# Patient Record
Sex: FEMALE | Race: WHITE | NOT HISPANIC OR LATINO | Employment: FULL TIME | URBAN - METROPOLITAN AREA
[De-identification: names, ages, dates, MRNs, and addresses within clinical notes are randomized per-mention and may not be internally consistent; named-entity substitution may affect disease eponyms.]

---

## 2024-02-26 ENCOUNTER — HOSPITAL ENCOUNTER (EMERGENCY)
Facility: HOSPITAL | Age: 19
Discharge: HOME | End: 2024-02-26
Attending: EMERGENCY MEDICINE | Admitting: EMERGENCY MEDICINE
Payer: COMMERCIAL

## 2024-02-26 VITALS
HEART RATE: 88 BPM | TEMPERATURE: 98.1 F | DIASTOLIC BLOOD PRESSURE: 73 MMHG | BODY MASS INDEX: 24.84 KG/M2 | RESPIRATION RATE: 18 BRPM | OXYGEN SATURATION: 98 % | SYSTOLIC BLOOD PRESSURE: 120 MMHG | WEIGHT: 135 LBS | HEIGHT: 62 IN

## 2024-02-26 DIAGNOSIS — N39.0 UTI (URINARY TRACT INFECTION): Primary | ICD-10-CM

## 2024-02-26 LAB
BACTERIA UR QL AUTO: ABNORMAL /HPF
BILIRUB UR QL STRIP: NEGATIVE
CLARITY UR: ABNORMAL
COLOR UR: YELLOW
EXT PREGNANCY TEST URINE: NEGATIVE
EXT. CONTROL: NORMAL
GLUCOSE UR STRIP-MCNC: NEGATIVE MG/DL
HGB UR QL STRIP.AUTO: ABNORMAL
KETONES UR STRIP-MCNC: ABNORMAL MG/DL
LEUKOCYTE ESTERASE UR QL STRIP: ABNORMAL
MUCOUS THREADS UR QL AUTO: ABNORMAL
NITRITE UR QL STRIP: NEGATIVE
NON-SQ EPI CELLS URNS QL MICRO: ABNORMAL /HPF
PH UR STRIP.AUTO: 6 [PH]
PROT UR STRIP-MCNC: ABNORMAL MG/DL
RBC #/AREA URNS AUTO: ABNORMAL /HPF
SP GR UR STRIP.AUTO: >=1.03 (ref 1–1.03)
UROBILINOGEN UR STRIP-ACNC: 2 MG/DL
WBC #/AREA URNS AUTO: ABNORMAL /HPF

## 2024-02-26 PROCEDURE — 81025 URINE PREGNANCY TEST: CPT | Performed by: EMERGENCY MEDICINE

## 2024-02-26 PROCEDURE — 87077 CULTURE AEROBIC IDENTIFY: CPT | Performed by: EMERGENCY MEDICINE

## 2024-02-26 PROCEDURE — 81001 URINALYSIS AUTO W/SCOPE: CPT | Performed by: EMERGENCY MEDICINE

## 2024-02-26 PROCEDURE — 87086 URINE CULTURE/COLONY COUNT: CPT | Performed by: EMERGENCY MEDICINE

## 2024-02-26 PROCEDURE — 99283 EMERGENCY DEPT VISIT LOW MDM: CPT

## 2024-02-26 PROCEDURE — 99284 EMERGENCY DEPT VISIT MOD MDM: CPT | Performed by: EMERGENCY MEDICINE

## 2024-02-26 RX ORDER — SULFAMETHOXAZOLE AND TRIMETHOPRIM 800; 160 MG/1; MG/1
1 TABLET ORAL ONCE
Status: COMPLETED | OUTPATIENT
Start: 2024-02-26 | End: 2024-02-26

## 2024-02-26 RX ORDER — SULFAMETHOXAZOLE AND TRIMETHOPRIM 800; 160 MG/1; MG/1
1 TABLET ORAL 2 TIMES DAILY
Qty: 14 TABLET | Refills: 0 | Status: SHIPPED | OUTPATIENT
Start: 2024-02-26 | End: 2024-03-04

## 2024-02-26 RX ADMIN — SULFAMETHOXAZOLE AND TRIMETHOPRIM 1 TABLET: 800; 160 TABLET ORAL at 21:03

## 2024-02-27 NOTE — ED PROVIDER NOTES
History  Chief Complaint   Patient presents with    Possible UTI     Pt c/o urinary frequency and burning for several days and blood in urine that started today. Pt taking somebody else keflex for several days.       Hx from patient few weeks of urinary frequency and burning -was taking keflex from a friend but symptoms got worse over last few days.        None       History reviewed. No pertinent past medical history.    History reviewed. No pertinent surgical history.    History reviewed. No pertinent family history.  I have reviewed and agree with the history as documented.    E-Cigarette/Vaping     E-Cigarette/Vaping Substances     Social History     Tobacco Use    Smoking status: Never    Smokeless tobacco: Never   Substance Use Topics    Alcohol use: Not Currently    Drug use: Not Currently       Review of Systems   Constitutional:  Negative for chills and fever.   HENT:  Negative for ear pain and sore throat.    Eyes:  Negative for pain and visual disturbance.   Respiratory:  Negative for cough and shortness of breath.    Cardiovascular:  Negative for chest pain and palpitations.   Gastrointestinal:  Negative for abdominal pain and vomiting.   Genitourinary:  Positive for dysuria and frequency. Negative for hematuria.   Musculoskeletal:  Negative for arthralgias and back pain.   Skin:  Negative for color change and rash.   Neurological:  Negative for seizures and syncope.   All other systems reviewed and are negative.      Physical Exam  Physical Exam  Vitals and nursing note reviewed.   Constitutional:       General: She is not in acute distress.     Appearance: She is well-developed.   HENT:      Head: Normocephalic and atraumatic.   Eyes:      Conjunctiva/sclera: Conjunctivae normal.   Cardiovascular:      Rate and Rhythm: Normal rate and regular rhythm.      Heart sounds: No murmur heard.  Pulmonary:      Effort: Pulmonary effort is normal. No respiratory distress.      Breath sounds: Normal breath  sounds.   Abdominal:      Palpations: Abdomen is soft.      Tenderness: There is no abdominal tenderness.   Musculoskeletal:         General: No swelling.      Cervical back: Neck supple.   Skin:     General: Skin is warm and dry.      Capillary Refill: Capillary refill takes less than 2 seconds.   Neurological:      Mental Status: She is alert.   Psychiatric:         Mood and Affect: Mood normal.         Vital Signs  ED Triage Vitals   Temperature Pulse Respirations Blood Pressure SpO2   02/26/24 2004 02/26/24 1959 02/26/24 1959 02/26/24 2001 02/26/24 1959   98.1 °F (36.7 °C) 88 18 120/73 98 %      Temp src Heart Rate Source Patient Position - Orthostatic VS BP Location FiO2 (%)   -- -- -- -- --             Pain Score       --                  Vitals:    02/26/24 1959 02/26/24 2001   BP:  120/73   Pulse: 88          Visual Acuity      ED Medications  Medications   sulfamethoxazole-trimethoprim (BACTRIM DS) 800-160 mg per tablet 1 tablet (1 tablet Oral Given 2/26/24 2103)       Diagnostic Studies  Results Reviewed       Procedure Component Value Units Date/Time    Urine Microscopic [[id number]]  (Abnormal) Collected: 02/26/24 2020    Lab Status: Final result Specimen: Urine, Clean Catch Updated: 02/26/24 2136     RBC, UA Innumerable /hpf      WBC, UA 30-50 /hpf      Epithelial Cells Occasional /hpf      Bacteria, UA Occasional /hpf      MUCUS THREADS Occasional    Urine culture [[id number]] Collected: 02/26/24 2020    Lab Status: In process Specimen: Urine, Clean Catch Updated: 02/26/24 2136    UA w Reflex to Microscopic w Reflex to Culture [[id number]]  (Abnormal) Collected: 02/26/24 2020    Lab Status: Final result Specimen: Urine, Clean Catch Updated: 02/26/24 2038     Color, UA Yellow     Clarity, UA Slightly Cloudy     Specific Gravity, UA >=1.030     pH, UA 6.0     Leukocytes, UA Large     Nitrite, UA Negative     Protein,  (2+) mg/dl      Glucose, UA Negative mg/dl      Ketones, UA 40 (2+) mg/dl       "Urobilinogen, UA 2.0 mg/dl      Bilirubin, UA Negative     Occult Blood, UA Large    POCT pregnancy, urine [[id number]]  (Normal) Resulted: 02/26/24 2023    Lab Status: Final result Updated: 02/26/24 2023     EXT Preg Test, Ur Negative     Control Valid                   No orders to display              Procedures  Procedures         ED Course         CRAFFT      Flowsheet Row Most Recent Value   CRAFFT Initial Screen: During the past 12 months, did you:    1. Drink any alcohol (more than a few sips)?  No Filed at: 02/26/2024 2005   2. Smoke any marijuana or hashish No Filed at: 02/26/2024 2005   3. Use anything else to get high? (\"anything else\" includes illegal drugs, over the counter and prescription drugs, and things that you sniff or 'alcantara')? No Filed at: 02/26/2024 2005                                            Medical Decision Making  Diff is UTI, less likely sepsis or pyelo    Amount and/or Complexity of Data Reviewed  Labs: ordered.    Risk  Prescription drug management.             Disposition  Final diagnoses:   UTI (urinary tract infection)     Time reflects when diagnosis was documented in both MDM as applicable and the Disposition within this note       Time User Action Codes Description Comment    2/26/2024  8:57 PM Regis Mcpherson Add [N39.0] UTI (urinary tract infection)           ED Disposition       ED Disposition   Discharge    Condition   Stable    Date/Time   Mon Feb 26, 2024 2057    Comment   Yanira Scott discharge to home/self care.                   Follow-up Information    None         Discharge Medication List as of 2/26/2024  8:58 PM        START taking these medications    Details   sulfamethoxazole-trimethoprim (BACTRIM DS) 800-160 mg per tablet Take 1 tablet by mouth 2 (two) times a day for 7 days smx-tmp DS (BACTRIM) 800-160 mg tabs (1tab q12 D10), Starting Mon 2/26/2024, Until Mon 3/4/2024, Normal             No discharge procedures on file.    PDMP Review       None            ED " Provider  Electronically Signed by             Regis Mcpherson,   02/27/24 0380

## 2024-02-28 LAB — BACTERIA UR CULT: ABNORMAL

## 2024-04-13 ENCOUNTER — APPOINTMENT (EMERGENCY)
Dept: RADIOLOGY | Facility: HOSPITAL | Age: 19
End: 2024-04-13
Payer: COMMERCIAL

## 2024-04-13 ENCOUNTER — HOSPITAL ENCOUNTER (EMERGENCY)
Facility: HOSPITAL | Age: 19
Discharge: HOME | End: 2024-04-13
Attending: EMERGENCY MEDICINE
Payer: COMMERCIAL

## 2024-04-13 VITALS
HEART RATE: 74 BPM | BODY MASS INDEX: 23.78 KG/M2 | WEIGHT: 130 LBS | DIASTOLIC BLOOD PRESSURE: 66 MMHG | RESPIRATION RATE: 18 BRPM | SYSTOLIC BLOOD PRESSURE: 100 MMHG | OXYGEN SATURATION: 99 % | TEMPERATURE: 98.6 F

## 2024-04-13 DIAGNOSIS — S62.316A CLOSED DISPLACED FRACTURE OF BASE OF FIFTH METACARPAL BONE OF RIGHT HAND, INITIAL ENCOUNTER: Primary | ICD-10-CM

## 2024-04-13 PROCEDURE — 99283 EMERGENCY DEPT VISIT LOW MDM: CPT

## 2024-04-13 PROCEDURE — 99284 EMERGENCY DEPT VISIT MOD MDM: CPT

## 2024-04-13 PROCEDURE — 29125 APPL SHORT ARM SPLINT STATIC: CPT

## 2024-04-13 PROCEDURE — 73130 X-RAY EXAM OF HAND: CPT

## 2024-04-13 RX ORDER — ACETAMINOPHEN 325 MG/1
975 TABLET ORAL ONCE
Status: COMPLETED | OUTPATIENT
Start: 2024-04-13 | End: 2024-04-13

## 2024-04-13 RX ORDER — IBUPROFEN 600 MG/1
600 TABLET ORAL ONCE
Status: COMPLETED | OUTPATIENT
Start: 2024-04-13 | End: 2024-04-13

## 2024-04-13 RX ADMIN — IBUPROFEN 600 MG: 600 TABLET, FILM COATED ORAL at 19:22

## 2024-04-13 RX ADMIN — ACETAMINOPHEN 975 MG: 325 TABLET, FILM COATED ORAL at 19:22

## 2024-04-13 NOTE — Clinical Note
Yanira Scott was seen and treated in our emergency department on 4/13/2024.        Other - See Comments    No heavy lifting with right hand    Diagnosis: Right hand fracture    Yanira  may return to work on return date.    She may return on this date: 04/17/2024    To be on light duty given right hand fracture until cleared by orthopedics.     If you have any questions or concerns, please don't hesitate to call.      REGINE Zhao    ______________________________           _______________          _______________  Hospital Representative                              Date                                Time

## 2024-04-13 NOTE — ED PROVIDER NOTES
History  Chief Complaint   Patient presents with    Hand Pain     Pt to ED for R hand pain. States she punched her bed frame last night. Able to feel and move fingers, + radial pulse.     The patient is a 19-year-old female presenting for evaluation of right hand pain.  Patient got upset last night and punched her bed frame.  She notes feeling instant pain to the right fifth knuckle.  She notes over the last day it has become progressively more swollen and bruised at the base of the right finger where the hand meets the wrist.  She expresses concern for fracture.  She is right-hand dominant.  She denies numbness/tingling to the right fifth finger.  She notes she is less able to make a fist due to pain.  She denies wrist pain, proximal right elbow, or proximal right shoulder pain.  She denies prior injury to this hand.      History provided by:  Patient   used: No        None       History reviewed. No pertinent past medical history.    History reviewed. No pertinent surgical history.    History reviewed. No pertinent family history.  I have reviewed and agree with the history as documented.    E-Cigarette/Vaping    E-Cigarette Use Current Every Day User      E-Cigarette/Vaping Substances     Social History     Tobacco Use    Smoking status: Never    Smokeless tobacco: Never   Vaping Use    Vaping status: Every Day   Substance Use Topics    Alcohol use: Not Currently    Drug use: Not Currently       Review of Systems   Musculoskeletal:  Positive for arthralgias (Right hand).   Skin:  Positive for color change (Bruising of right hand).   All other systems reviewed and are negative.      Physical Exam  Physical Exam  Vitals and nursing note reviewed.   Constitutional:       General: She is not in acute distress.     Appearance: Normal appearance. She is normal weight. She is not ill-appearing, toxic-appearing or diaphoretic.   HENT:      Head: Normocephalic and atraumatic.      Nose: Nose normal.       Mouth/Throat:      Mouth: Mucous membranes are moist.      Pharynx: Oropharynx is clear.   Eyes:      Extraocular Movements: Extraocular movements intact.      Conjunctiva/sclera: Conjunctivae normal.   Cardiovascular:      Rate and Rhythm: Normal rate.   Pulmonary:      Effort: Pulmonary effort is normal. No respiratory distress.   Musculoskeletal:      Right wrist: No swelling, deformity or bony tenderness. Normal range of motion.      Right hand: Swelling and bony tenderness (Proximal fifth metacarpal) present. Decreased range of motion. Decreased strength of thumb/finger opposition and wrist extension. Normal strength of finger abduction. Normal sensation. Normal capillary refill. Normal pulse.      Left hand: Normal.        Hands:       Cervical back: Normal range of motion and neck supple.      Comments: No rotational deformity of the fifth finger when closing fist.  Slight decreased strength and wrist extension and T/F opposition   Skin:     General: Skin is warm and dry.      Capillary Refill: Capillary refill takes less than 2 seconds.   Neurological:      General: No focal deficit present.      Mental Status: She is alert and oriented to person, place, and time. Mental status is at baseline.         Vital Signs  ED Triage Vitals [04/13/24 1809]   Temperature Pulse Respirations Blood Pressure SpO2   98.6 °F (37 °C) 74 18 100/66 99 %      Temp Source Heart Rate Source Patient Position - Orthostatic VS BP Location FiO2 (%)   Temporal Monitor Sitting Left arm --      Pain Score       4           Vitals:    04/13/24 1809   BP: 100/66   Pulse: 74   Patient Position - Orthostatic VS: Sitting         Visual Acuity      ED Medications  Medications   acetaminophen (TYLENOL) tablet 975 mg (975 mg Oral Given 4/13/24 1922)   ibuprofen (MOTRIN) tablet 600 mg (600 mg Oral Given 4/13/24 1922)       Diagnostic Studies  Results Reviewed       None                   XR hand 3+ views RIGHT    (Results Pending)         "      Procedures  Splint application    Date/Time: 4/13/2024 7:15 PM    Performed by: REGINE Zhao  Authorized by: REGINE Zhao  Universal Protocol:  Consent: Verbal consent obtained.  Risks and benefits: risks, benefits and alternatives were discussed  Consent given by: patient  Time out: Immediately prior to procedure a \"time out\" was called to verify the correct patient, procedure, equipment, support staff and site/side marked as required.  Timeout called at: 4/13/2024 7:10 PM.  Patient understanding: patient states understanding of the procedure being performed  Radiology Images displayed and confirmed. If images not available, report reviewed: imaging studies available  Patient identity confirmed: verbally with patient    Pre-procedure details:     Sensation:  Normal    Skin color:  Pink  Procedure details:     Laterality:  Right    Location:  Hand    Hand:  R hand    Splint type:  Ulnar gutter    Supplies:  2 layer wrap, 3 layer wrap and Ortho-Glass  Post-procedure details:     Pain:  Improved    Sensation:  Normal    Skin color:  Pink    Patient tolerance of procedure:  Tolerated well, no immediate complications           ED Course                               SBIRT 20yo+      Flowsheet Row Most Recent Value   Initial Alcohol Screen: US AUDIT-C     1. How often do you have a drink containing alcohol? 0 Filed at: 04/13/2024 1810   2. How many drinks containing alcohol do you have on a typical day you are drinking?  0 Filed at: 04/13/2024 1810   3a. Male UNDER 65: How often do you have five or more drinks on one occasion? 0 Filed at: 04/13/2024 1810   3b. FEMALE Any Age, or MALE 65+: How often do you have 4 or more drinks on one occassion? 0 Filed at: 04/13/2024 1810   Audit-C Score 0 Filed at: 04/13/2024 1810   CIRILO: How many times in the past year have you...    Used an illegal drug or used a prescription medication for non-medical reasons? Never Filed at: 04/13/2024 1810      "                 Medical Decision Making  DDx including but not limited to: contusion, fracture, sprain    Acute fracture of base of fifth metacarpal of right hand.  Patient splinted.  Medicated with Tylenol and Motrin.  Plan for orthopedic surgery referral, ongoing NSAIDs and Tylenol for comfort.  Work note provided.  Strict return precautions discussed and she demonstrates understanding by teach back method.  Patient with improved appearance and pain control at time of discharge.    Problems Addressed:  Closed displaced fracture of base of fifth metacarpal bone of right hand, initial encounter: acute illness or injury    Amount and/or Complexity of Data Reviewed  Radiology: ordered.    Risk  OTC drugs.  Prescription drug management.             Disposition  Final diagnoses:   Closed displaced fracture of base of fifth metacarpal bone of right hand, initial encounter     Time reflects when diagnosis was documented in both MDM as applicable and the Disposition within this note       Time User Action Codes Description Comment    4/13/2024  7:06 PM Alanna Edwards Add [S62.316A] Closed displaced fracture of base of fifth metacarpal bone of right hand, initial encounter           ED Disposition       ED Disposition   Discharge    Condition   Stable    Date/Time   Sat Apr 13, 2024 1905    Comment   Yanira Scott discharge to home/self care.                   Follow-up Information       Follow up With Specialties Details Why Contact Info Additional Information    [facility] [place] [facility] Call on 4/15/2024 to schedule an appointment [address]  [address] [address] [phone] [facility] [place], [address], [facility] [address] [place], [place], [address] [phone]            There are no discharge medications for this patient.          PDMP Review       None            ED Provider  Electronically Signed by             Alanna  REGINE Edwards  04/13/24 2221

## 2024-04-15 ENCOUNTER — OFFICE VISIT (OUTPATIENT)
Dept: OBGYN CLINIC | Facility: CLINIC | Age: 19
End: 2024-04-15
Payer: COMMERCIAL

## 2024-04-15 VITALS
DIASTOLIC BLOOD PRESSURE: 68 MMHG | WEIGHT: 135 LBS | BODY MASS INDEX: 24.84 KG/M2 | SYSTOLIC BLOOD PRESSURE: 108 MMHG | HEIGHT: 62 IN

## 2024-04-15 DIAGNOSIS — S62.316A CLOSED DISPLACED FRACTURE OF BASE OF FIFTH METACARPAL BONE OF RIGHT HAND, INITIAL ENCOUNTER: ICD-10-CM

## 2024-04-15 PROCEDURE — 99203 OFFICE O/P NEW LOW 30 MIN: CPT | Performed by: ORTHOPAEDIC SURGERY

## 2024-04-15 RX ORDER — IBUPROFEN 200 MG
TABLET ORAL EVERY 6 HOURS PRN
COMMUNITY

## 2024-04-15 RX ORDER — ACETAMINOPHEN 500 MG
500 TABLET ORAL EVERY 6 HOURS PRN
COMMUNITY

## 2024-04-15 NOTE — PROGRESS NOTES
Assessment/Plan:  1. Closed displaced fracture of base of fifth metacarpal bone of right hand, initial encounter  Ambulatory Referral to Orthopedic Surgery          Subjective history, physical examination performed, diagnostic imaging reviewed at today's visit  Diagnosis was discussed right fifth metacarpal fracture, DOI 4/13/2024.  Treatment options were discussed which included nonoperative treatment in the form of cast or splint which can be removed for hygiene and for HEP for finger ROM.    Risks, benefits, and realistic expectations for treatment options were discussed.    The patient was given an opportunity to ask questions.  Questions were answered to the patient's satisfaction.    Through shared decision making, the patient decided to move forward with TKO splint of right hand at all times. May remove splint for hygeien use only.  Discussed with patient that it takes 6-8 weeks for fracture to properly heal.  Work note was given to patient stating she must wear splint at all times and no lifting pushing pulling objects greater than 5 pounds.  Patient will follow-up in 4 weeks for reevaluation and repeat x-rays of right hand.        cc: Right hand pain    Subjective:   Yanira Scott is a right hand dominant 19 y.o. female who presents for initial evaluation of right hand pain.  Patient states 2 days ago she punched her bed frame and had significant pain within her right hand.  Patient states she went to the emergency department that day where she obtained x-rays and was put in a splint for her right hand due to hand fracture.  Patient states she has been taking Tylenol and ibuprofen for pain control.  Patient states she continues to have swelling throughout her right hand that can go down to her fingers causing stiffness within her hand.  Patient has not no previous injuries to right hand.      Review of Systems   Constitutional:  Positive for activity change. Negative for chills, fever and unexpected weight  change.   HENT:  Negative for hearing loss, nosebleeds and sore throat.    Eyes:  Negative for pain, redness and visual disturbance.   Respiratory:  Negative for cough, shortness of breath and wheezing.    Cardiovascular:  Negative for chest pain, palpitations and leg swelling.   Gastrointestinal:  Negative for abdominal pain, nausea and vomiting.   Endocrine: Negative for polydipsia and polyuria.   Genitourinary:  Negative for dysuria and hematuria.   Musculoskeletal:  Positive for arthralgias.        See HPI   Skin:  Negative for rash and wound.   Neurological:  Negative for dizziness, numbness and headaches.   Psychiatric/Behavioral:  Negative for decreased concentration and suicidal ideas. The patient is not nervous/anxious.          History reviewed. No pertinent past medical history.    History reviewed. No pertinent surgical history.    History reviewed. No pertinent family history.    Social History     Occupational History    Not on file   Tobacco Use    Smoking status: Never    Smokeless tobacco: Never   Vaping Use    Vaping status: Every Day   Substance and Sexual Activity    Alcohol use: Not Currently    Drug use: Not Currently    Sexual activity: Not on file         Current Outpatient Medications:     acetaminophen (TYLENOL) 500 mg tablet, Take 500 mg by mouth every 6 (six) hours as needed for mild pain, Disp: , Rfl:     ibuprofen (MOTRIN) 200 mg tablet, Take by mouth every 6 (six) hours as needed for mild pain, Disp: , Rfl:     No Known Allergies    Objective:  Vitals:    04/15/24 0837   BP: 108/68       The patient was awake, alert, and oriented to person, place, and time.  No acute distress.  Normocephalic.  EOMI.  Mucous membranes moist.  Normal respiratory effort.    Examination of the affected extremity was compared to the unaffected extremity.  Skin was intact.  +swelling +ecchymosis.  No deformity.  Hand and fingers were warm and well-perfused.  Capillary refill was brisk. Active range of motion  of wrists and fingers limited.    No rotational deformity   Limited ROM due to swelling.  +TTP base of 5th MC    Imaging/Diagnostic Studies:    I reviewed imaging studies dated 4/15/24 which included right hand XR .  These images studies demonstrated base of fifth metacarpal fracture.           Scribe Attestation      I,:  Jose Mercedes am acting as a scribe while in the presence of the attending physician.:       I,:  Genesis Barrett MD personally performed the services described in this documentation    as scribed in my presence.:               This document was created using speech voice recognition software.   Grammatical errors, random word insertions, pronoun errors, and incomplete sentences are an occasional consequence of this system due to software limitations, ambient noise, and hardware issues.   Any formal questions or concerns about content, text, or information contained within the body of this dictation should be directly addressed to the provider for clarification.

## 2024-04-15 NOTE — LETTER
April 15, 2024     Patient: Yanira Scott  YOB: 2005  Date of Visit: 4/15/2024      To Whom it May Concern:    Yanira Scott is under my professional care. aYnira was seen in my office on 4/15/2024. Yanira should use splint at all times while at work.  Patient may not lift, push, or pull not greater then 5 lbs. please allow accommodations    If you have any questions or concerns, please don't hesitate to call.         Sincerely,          Genesis Barrett MD        CC: No Recipients

## 2024-05-13 ENCOUNTER — APPOINTMENT (OUTPATIENT)
Dept: RADIOLOGY | Facility: CLINIC | Age: 19
End: 2024-05-13
Payer: COMMERCIAL

## 2024-05-13 ENCOUNTER — OFFICE VISIT (OUTPATIENT)
Dept: OBGYN CLINIC | Facility: CLINIC | Age: 19
End: 2024-05-13
Payer: COMMERCIAL

## 2024-05-13 VITALS
WEIGHT: 142 LBS | SYSTOLIC BLOOD PRESSURE: 120 MMHG | HEIGHT: 62 IN | BODY MASS INDEX: 26.13 KG/M2 | DIASTOLIC BLOOD PRESSURE: 78 MMHG

## 2024-05-13 DIAGNOSIS — S62.316D CLOSED DISPLACED FRACTURE OF BASE OF FIFTH METACARPAL BONE OF RIGHT HAND WITH ROUTINE HEALING, SUBSEQUENT ENCOUNTER: Primary | ICD-10-CM

## 2024-05-13 DIAGNOSIS — S62.316D CLOSED DISPLACED FRACTURE OF BASE OF FIFTH METACARPAL BONE OF RIGHT HAND WITH ROUTINE HEALING, SUBSEQUENT ENCOUNTER: ICD-10-CM

## 2024-05-13 PROCEDURE — 99213 OFFICE O/P EST LOW 20 MIN: CPT | Performed by: ORTHOPAEDIC SURGERY

## 2024-05-13 PROCEDURE — 73130 X-RAY EXAM OF HAND: CPT

## 2024-05-13 NOTE — PROGRESS NOTES
Assessment/Plan:  1. Closed displaced fracture of base of fifth metacarpal bone of right hand with routine healing, subsequent encounter  XR hand 3+ vw right    Ambulatory Referral to PT/OT Hand Therapy          Subjective history, physical examination performed, diagnostic imaging reviewed at today's visit  Diagnosis was discussed right fifth metacarpal fracture, DOI 4/13/2024.  Imaging showed stable healing fracture.   Due to noted wrist stiffness encouraged patient to come out of TKO splint on daily basis to work on range of motion. She doesn't have to wear splint when home. She will also have referral for OT to rehab splint.   See patient back in 2 weeks to make sure she has regained her motion.         Subjective:   Yanira Scott is a right hand dominant 19 y.o. female who presents for follow up for closed displaced fracture base of 5th metacarpal.  She punched her bed frame and had significant pain within her right hand 4/13/24. She was placed in TKO splint. Here for repeat imaging. She is icing for pain. She is not coming out of splint daily for range of motion. Has formed an area of skin irritation over dorsal PIP joint. No signs of infection.       Review of Systems   Constitutional:  Positive for activity change. Negative for chills, fever and unexpected weight change.   HENT:  Negative for hearing loss, nosebleeds and sore throat.    Eyes:  Negative for pain, redness and visual disturbance.   Respiratory:  Negative for cough, shortness of breath and wheezing.    Cardiovascular:  Negative for chest pain, palpitations and leg swelling.   Gastrointestinal:  Negative for abdominal pain, nausea and vomiting.   Endocrine: Negative for polydipsia and polyuria.   Genitourinary:  Negative for dysuria and hematuria.   Musculoskeletal:  Positive for arthralgias.        See HPI   Skin:  Negative for rash and wound.   Neurological:  Negative for dizziness, numbness and headaches.   Psychiatric/Behavioral:  Negative for  decreased concentration and suicidal ideas. The patient is not nervous/anxious.          History reviewed. No pertinent past medical history.    History reviewed. No pertinent surgical history.    History reviewed. No pertinent family history.    Social History     Occupational History    Not on file   Tobacco Use    Smoking status: Never    Smokeless tobacco: Never   Vaping Use    Vaping status: Every Day   Substance and Sexual Activity    Alcohol use: Not Currently    Drug use: Not Currently    Sexual activity: Not on file         Current Outpatient Medications:     acetaminophen (TYLENOL) 500 mg tablet, Take 500 mg by mouth every 6 (six) hours as needed for mild pain, Disp: , Rfl:     ibuprofen (MOTRIN) 200 mg tablet, Take by mouth every 6 (six) hours as needed for mild pain, Disp: , Rfl:     No Known Allergies    Objective:  Vitals:    05/13/24 0934   BP: 120/78       The patient was awake, alert, and oriented to person, place, and time.  No acute distress.  Normocephalic.  EOMI.  Mucous membranes moist.  Normal respiratory effort.    Examination of the affected extremity was compared to the unaffected extremity.  Skin was intact. Skin with marks related to wearing splint tightly.  No swelling. No ecchymosis.  No deformity.  Hand and fingers were warm and well-perfused.  Capillary refill was brisk.     No rotational deformity   Limited ROM of wrist joint secondary to stiffness, mild swelling in wrist, 4th and 5th digits.   Nontender over base of 5th metacarpal.   Skin abrasion over dorsal PIP joint of ring finger with no signs of infection.     Imaging/Diagnostic Studies:    I reviewed imaging studies which included right hand XR .  These images studies demonstrated healing stable base of fifth metacarpal fracture. No change in position over time.      Scribe Attestation      I,:  Hermann Cloud am acting as a scribe while in the presence of the attending physician.:       I,:  Genesis Barrett MD  personally performed the services described in this documentation    as scribed in my presence.:

## 2024-05-13 NOTE — LETTER
May 13, 2024     Patient: Yanira Scott  YOB: 2005  Date of Visit: 5/13/2024      To Whom it May Concern:    Yanira Scott is under my professional care. Yanira was seen in my office on 5/13/2024. Yanira is not able to work at this time. Re evaluate in 2 weeks.     If you have any questions or concerns, please don't hesitate to call.         Sincerely,          Genesis Barrett MD        CC: No Recipients

## 2024-05-20 ENCOUNTER — EVALUATION (OUTPATIENT)
Dept: OCCUPATIONAL THERAPY | Facility: CLINIC | Age: 19
End: 2024-05-20
Payer: COMMERCIAL

## 2024-05-20 DIAGNOSIS — S62.316D CLOSED DISPLACED FRACTURE OF BASE OF FIFTH METACARPAL BONE OF RIGHT HAND WITH ROUTINE HEALING, SUBSEQUENT ENCOUNTER: ICD-10-CM

## 2024-05-20 PROCEDURE — 97110 THERAPEUTIC EXERCISES: CPT | Performed by: OCCUPATIONAL THERAPIST

## 2024-05-20 PROCEDURE — 97165 OT EVAL LOW COMPLEX 30 MIN: CPT | Performed by: OCCUPATIONAL THERAPIST

## 2024-05-20 PROCEDURE — 97140 MANUAL THERAPY 1/> REGIONS: CPT | Performed by: OCCUPATIONAL THERAPIST

## 2024-05-20 NOTE — PROGRESS NOTES
OT Evaluation     Today's date: 2024  Patient name: Yanira Scott  : 2005  MRN: [id number]  Referring provider: Genesis Barrett,*  Dx:   Encounter Diagnosis     ICD-10-CM    1. Closed displaced fracture of base of fifth metacarpal bone of right hand with routine healing, subsequent encounter  S62.316D Ambulatory Referral to PT/OT Hand Therapy                     Assessment  Impairments: abnormal or restricted ROM, activity intolerance and impaired physical strength  Functional limitations: use tools , lift  Symptom irritability: low    Assessment details: Yanira presents s/p immobilization of R wrist , hand, /5 for base 5th MC fracture. She has mild tenderness at the 5th CMC . She has mild  ROM limitation . She has mild edema . She has pain with gripping and lifting. She is unable to work as a  . She is unable to use tools with her R hand . A metacarpal cuff splint will be fabricated to [facility] for Ulnar gutter.     Yanira would like to start therapy after MD visit .  Understanding of Dx/Px/POC: good     Prognosis: good    Goals  STG-1 wk  1- I with HEP  2- worst pain 3/10    LTG- 4 wks  1- no pain R hand  2- improve  10#  3- full digit ROM R 5th  4- RTW full duty     Plan  Patient would benefit from: OT eval and skilled occupational therapy  Planned modality interventions: thermotherapy: hydrocollator packs and cryotherapy    Planned therapy interventions: ADL retraining, IASTM, joint mobilization, kinesiology taping, manual therapy, massage, strengthening, stretching, graded motor, home exercise program and functional ROM exercises    Frequency: 1-2x wk.  Duration in weeks: 4  Plan of Care beginning date: 2024  Treatment plan discussed with: patient      Subjective Evaluation    History of Present Illness  Date of onset: 2024  Mechanism of injury: Yanira suffered fx R 5th base MC when she punched a bed frame.  She is wearing an ulnar gutter orthosis.   Quality of life:  fair    Patient Goals  Patient goals for therapy: decreased pain, increased motion, increased strength, independence with ADLs/IADLs and return to work    Pain  Current pain ratin  At best pain ratin  At worst pain ratin  Location: R hand  Quality: radiating and sharp  Relieving factors: rest and support  Exacerbated by: gripping , position.  Progression: improved    Social Support  Steps to enter house: yes  Stairs in house: yes   Lives in: multiple-level home    Working: .  Hand dominance: right    Treatments  Previous treatment: immobilization  Current treatment: immobilization      Objective     Tenderness     Additional Tenderness Details  Mild tenderness base 5th CMC    Neurological Testing     Additional Neurological Details  Denies paresthesias     Active Range of Motion     Right Elbow   Normal active range of motion    Right Wrist   Wrist flexion: 75 degrees   Wrist extension: 70 degrees   Radial deviation: 30 degrees   Ulnar deviation: 25 degrees     Right Digits   Flexion   Little     MCP: 90    PIP: 85    DIP: 50  Extension   Little     MCP: 5    PIP: 0    DIP: 0    Additional Active Range of Motion Details  PROM WNL     Milkd lag     Strength/Myotome Testing     Left Wrist/Hand      (2nd hand position)     Trial 1: 58    Right Wrist/Hand      (2nd hand position)     Trial 1: 5    Swelling     Left Wrist/Hand   Circumference MCP: 17.7 cm    Right Wrist/Hand   Circumference MCP: 18 cm             Precautions: universal ; DOI 24      Manuals   IE            Graston R hand  4m            Retrograde  4m            Ext stretches 2m                         HEP- ROM / putty  4x day            MC cuff splint  4m                                                                                          Ther Ex             A/PROM R SF  4m            ABD /ADD 10x            Wrist e/f 1#  3x10            Flexbar P/S Yellow  3x10            Powerweb   Red  3x10                                                    Ther Activity                                       Gait Training                                       Modalities               8m

## 2024-05-28 ENCOUNTER — OFFICE VISIT (OUTPATIENT)
Dept: OCCUPATIONAL THERAPY | Facility: CLINIC | Age: 19
End: 2024-05-28
Payer: COMMERCIAL

## 2024-05-28 ENCOUNTER — OFFICE VISIT (OUTPATIENT)
Dept: OBGYN CLINIC | Facility: CLINIC | Age: 19
End: 2024-05-28
Payer: COMMERCIAL

## 2024-05-28 VITALS
SYSTOLIC BLOOD PRESSURE: 116 MMHG | WEIGHT: 142 LBS | DIASTOLIC BLOOD PRESSURE: 74 MMHG | HEIGHT: 62 IN | BODY MASS INDEX: 26.13 KG/M2 | HEART RATE: 74 BPM

## 2024-05-28 DIAGNOSIS — S62.316D CLOSED DISPLACED FRACTURE OF BASE OF FIFTH METACARPAL BONE OF RIGHT HAND WITH ROUTINE HEALING, SUBSEQUENT ENCOUNTER: Primary | ICD-10-CM

## 2024-05-28 PROCEDURE — 97110 THERAPEUTIC EXERCISES: CPT | Performed by: OCCUPATIONAL THERAPIST

## 2024-05-28 PROCEDURE — 97140 MANUAL THERAPY 1/> REGIONS: CPT | Performed by: OCCUPATIONAL THERAPIST

## 2024-05-28 PROCEDURE — 99212 OFFICE O/P EST SF 10 MIN: CPT | Performed by: ORTHOPAEDIC SURGERY

## 2024-05-28 NOTE — PROGRESS NOTES
Daily Note     Today's date: 2024  Patient name: Yanira Scott  : 2005  MRN: [id number]  Referring provider: Genesis Barrett,*  Dx:   Encounter Diagnosis     ICD-10-CM    1. Closed displaced fracture of base of fifth metacarpal bone of right hand with routine healing, subsequent encounter  S62.316D                      Subjective: I am still weak       Objective: See treatment diary below      Assessment: Tolerated treatment well. Patient  has  good ROM. She tolerated light  strengthening . She is unable to RTW as a  because  of weakness      Plan: Continue per plan of care.      Precautions: universal ; DOI 24      Manuals   IE            Graston R hand  4m 4m           Retrograde  4m 4m           Ext stretches 2m 2m                        HEP- ROM / putty  4x day            MC cuff splint  4m                                                                                          Ther Ex             A/PROM R SF  4m 2m           ABD /ADD 10x 10x           Wrist e/f/rd 1#  3x10 3#  3x10           Flexbar P/S Yellow  3x10 red  3x10           Powerweb   Red  3x10 Black  4x10           Fxnl grasp wt ball  2.2#  10x           Flexgrip ext  Yellow 3x10                        Ther Activity             Pinch pins w/pegs  Blue  5sets           Gripper w/pegs  Set 2  5sets           Gait Training                                                  Modalities                      [facility] 8m  8m

## 2024-05-28 NOTE — PROGRESS NOTES
ASSESSMENT/PLAN:    Assessment:   Right fifth metacarpal fracture, DOI 4/13/2024.     Plan:   Patient continues to improve following up for right fifth metacarpal fracture, DOI 4/13/2024.  Imaging from 5/13/2024 demonstrated stable healing fracture.  Discussed with patient that she can continue with therapy to initiate advance range of motion and strengthening of right hand.  Discussed with patient that she should continue to work on this in order to return to work without restrictions within the next 2 weeks.  New therapy referral was given to patient work on advance range of motion and strengthening.  Patient will follow-up in 2 weeks for reevaluation where anticipate patient to have full range of motion and strength to return to work as a .      Follow Up:  2  week(s)    To Do Next Visit:  Reevaluate right hand ROM and strength, at this visit I anticipate patient being able to return to work without restrictions.      _____________________________________________________  CHIEF COMPLAINT:  Chief Complaint   Patient presents with    Right Hand - Fracture, Follow-up         SUBJECTIVE:  Yanira Scott is a 19 y.o. female who presents for follow up regarding right fifth metacarpal fracture. She has been compliant with TKO splint and has been working on range of motion of right hand.     PAST MEDICAL HISTORY:  History reviewed. No pertinent past medical history.    PAST SURGICAL HISTORY:  History reviewed. No pertinent surgical history.    FAMILY HISTORY:  History reviewed. No pertinent family history.    SOCIAL HISTORY:  Social History     Tobacco Use    Smoking status: Never    Smokeless tobacco: Never   Vaping Use    Vaping status: Every Day   Substance Use Topics    Alcohol use: Not Currently    Drug use: Not Currently       MEDICATIONS:    Current Outpatient Medications:     acetaminophen (TYLENOL) 500 mg tablet, Take 500 mg by mouth every 6 (six) hours as needed for mild pain (Patient not taking:  "Reported on 5/28/2024), Disp: , Rfl:     ibuprofen (MOTRIN) 200 mg tablet, Take by mouth every 6 (six) hours as needed for mild pain (Patient not taking: Reported on 5/28/2024), Disp: , Rfl:     ALLERGIES:  No Known Allergies    REVIEW OF SYSTEMS:  Pertinent items are noted in HPI.  A comprehensive review of systems was negative.    LABS:  HgA1c: No results found for: \"HGBA1C\"  BMP: No results found for: \"GLUCOSE\", \"CALCIUM\", \"NA\", \"K\", \"CO2\", \"CL\", \"BUN\", \"CREATININE\"        _____________________________________________________  PHYSICAL EXAMINATION:  Vital signs: /74   Pulse 74   Ht 5' 2\" (1.575 m)   Wt 64.4 kg (142 lb)   BMI 25.97 kg/m²   General: well developed and well nourished, alert, oriented times 3, and appears comfortable  Psychiatric: Normal  HEENT: Trachea Midline, No torticollis  Cardiovascular: No discernable arrhythmia  Pulmonary: No wheezing or stridor  Abdomen: No rebound or guarding  Extremities: No peripheral edema  Skin: No masses, erythema, lacerations, fluctation, ulcerations  Neurovascular: Sensation Intact to the Median, Ulnar, Radial Nerve, Motor Intact to the Median, Ulnar, Radial Nerve, and Pulses Intact    MUSCULOSKELETAL EXAMINATION:  Examination of the affected extremity was compared to the unaffected extremity.  Skin was intact. Skin with marks related to wearing splint tightly.  No swelling. No ecchymosis.  No deformity.  Hand and fingers were warm and well-perfused.  Capillary refill was brisk.      No rotational deformity   FROM wrist and fingers  Nontender over base of 5th metacarpal.   Strength 5/5 WE/WF,  slightly diminished compared to contralateral hand.    _____________________________________________________  STUDIES REVIEWED:  No Studies to review      PROCEDURES PERFORMED:  Procedures  No Procedures performed today    Scribe Attestation      I,:  Jose Mercedes am acting as a scribe while in the presence of the attending physician.:       I,:  Genesis Ramon " MD Nena personally performed the services described in this documentation    as scribed in my presence.:

## 2024-06-12 ENCOUNTER — OFFICE VISIT (OUTPATIENT)
Dept: OBGYN CLINIC | Facility: CLINIC | Age: 19
End: 2024-06-12
Payer: COMMERCIAL

## 2024-06-12 VITALS
WEIGHT: 139 LBS | HEIGHT: 62 IN | DIASTOLIC BLOOD PRESSURE: 78 MMHG | BODY MASS INDEX: 25.58 KG/M2 | SYSTOLIC BLOOD PRESSURE: 113 MMHG

## 2024-06-12 DIAGNOSIS — S62.316D CLOSED DISPLACED FRACTURE OF BASE OF FIFTH METACARPAL BONE OF RIGHT HAND WITH ROUTINE HEALING, SUBSEQUENT ENCOUNTER: Primary | ICD-10-CM

## 2024-06-12 PROCEDURE — 99212 OFFICE O/P EST SF 10 MIN: CPT | Performed by: ORTHOPAEDIC SURGERY

## 2024-06-12 NOTE — PROGRESS NOTES
Assessment/Plan:  1. Closed displaced fracture of base of fifth metacarpal bone of right hand with routine healing, subsequent encounter            On exam, the patient is non tender and has full strength.  The patient was given an opportunity to ask questions.  Questions were answered to the patient's satisfaction.    Through shared decision making, the patient decided to move forward with  activities as tolerated.          cc: right hand follow up    Subjective:   Yanira Scott is a 19 y.o. female who presents to the office today for follow up evaluation s/p closed treatment for a right 5th metacarpal fracture, DOI 4/13/24. The patient states she is doing well. She states her strength has been improving.      Review of Systems   Constitutional:  Negative for chills and fever.   HENT:  Negative for drooling and sneezing.    Eyes:  Negative for redness.   Respiratory:  Negative for cough and wheezing.    Gastrointestinal:  Negative for nausea and vomiting.   Musculoskeletal:  Negative for arthralgias, joint swelling and myalgias.   Neurological:  Negative for weakness and numbness.   Psychiatric/Behavioral:  Negative for behavioral problems. The patient is not nervous/anxious.          History reviewed. No pertinent past medical history.    History reviewed. No pertinent surgical history.    History reviewed. No pertinent family history.    Social History     Occupational History    Not on file   Tobacco Use    Smoking status: Never    Smokeless tobacco: Never   Vaping Use    Vaping status: Every Day   Substance and Sexual Activity    Alcohol use: Not Currently    Drug use: Not Currently    Sexual activity: Not on file         Current Outpatient Medications:     acetaminophen (TYLENOL) 500 mg tablet, Take 500 mg by mouth every 6 (six) hours as needed for mild pain (Patient not taking: Reported on 5/28/2024), Disp: , Rfl:     ibuprofen (MOTRIN) 200 mg tablet, Take by mouth every 6 (six) hours as needed for mild pain  (Patient not taking: Reported on 5/28/2024), Disp: , Rfl:     No Known Allergies    Objective:  Vitals:    06/12/24 1349   BP: 113/78       The patient was awake, alert, and oriented to person, place, and time.  No acute distress.  Normocephalic.  EOMI.  Mucous membranes moist.  Normal respiratory effort.    Examination of the affected extremity was compared to the unaffected extremity.  Skin was intact.  No swelling or ecchymosis.  No deformity.  Hand and fingers were warm and well-perfused.  Capillary refill was brisk.  Full active range of motion of the elbows, forearms, wrists, and fingers.  5/5  wrist flexor/extensors and .       Imaging/Diagnostic Studies:    No new x-rays to review      This document was created using speech voice recognition software.   Grammatical errors, random word insertions, pronoun errors, and incomplete sentences are an occasional consequence of this system due to software limitations, ambient noise, and hardware issues.   Any formal questions or concerns about content, text, or information contained within the body of this dictation should be directly addressed to the provider for clarification.

## 2024-06-12 NOTE — LETTER
June 12, 2024     Patient: Yanira Scott  YOB: 2005  Date of Visit: 6/12/2024      To Whom it May Concern:    Yanira Scott is under my professional care. Yanira was seen in my office on 6/12/2024. Yanira may return to work full duty with no restrictions.     If you have any questions or concerns, please don't hesitate to call.         Sincerely,          Genesis Barrett MD

## 2024-11-21 ENCOUNTER — HOSPITAL ENCOUNTER (EMERGENCY)
Facility: HOSPITAL | Age: 19
Discharge: HOME | End: 2024-11-21
Attending: EMERGENCY MEDICINE
Payer: COMMERCIAL

## 2024-11-21 ENCOUNTER — APPOINTMENT (EMERGENCY)
Dept: RADIOLOGY | Facility: HOSPITAL | Age: 19
End: 2024-11-21
Payer: COMMERCIAL

## 2024-11-21 VITALS
OXYGEN SATURATION: 98 % | HEART RATE: 82 BPM | SYSTOLIC BLOOD PRESSURE: 126 MMHG | BODY MASS INDEX: 23 KG/M2 | DIASTOLIC BLOOD PRESSURE: 61 MMHG | WEIGHT: 125 LBS | RESPIRATION RATE: 18 BRPM | TEMPERATURE: 98.7 F | HEIGHT: 62 IN

## 2024-11-21 DIAGNOSIS — M25.552 ACUTE HIP PAIN, LEFT: Primary | ICD-10-CM

## 2024-11-21 LAB
EXT PREGNANCY TEST URINE: NEGATIVE
EXT. CONTROL: NORMAL

## 2024-11-21 PROCEDURE — 99283 EMERGENCY DEPT VISIT LOW MDM: CPT

## 2024-11-21 PROCEDURE — 99284 EMERGENCY DEPT VISIT MOD MDM: CPT | Performed by: EMERGENCY MEDICINE

## 2024-11-21 PROCEDURE — 96372 THER/PROPH/DIAG INJ SC/IM: CPT

## 2024-11-21 PROCEDURE — 73502 X-RAY EXAM HIP UNI 2-3 VIEWS: CPT

## 2024-11-21 PROCEDURE — 81025 URINE PREGNANCY TEST: CPT | Performed by: EMERGENCY MEDICINE

## 2024-11-21 RX ORDER — LIDOCAINE 50 MG/G
1 PATCH TOPICAL ONCE
Status: DISCONTINUED | OUTPATIENT
Start: 2024-11-21 | End: 2024-11-21 | Stop reason: HOSPADM

## 2024-11-21 RX ORDER — ACETAMINOPHEN 325 MG/1
975 TABLET ORAL ONCE
Status: COMPLETED | OUTPATIENT
Start: 2024-11-21 | End: 2024-11-21

## 2024-11-21 RX ORDER — KETOROLAC TROMETHAMINE 30 MG/ML
15 INJECTION, SOLUTION INTRAMUSCULAR; INTRAVENOUS ONCE
Status: COMPLETED | OUTPATIENT
Start: 2024-11-21 | End: 2024-11-21

## 2024-11-21 RX ADMIN — KETOROLAC TROMETHAMINE 15 MG: 30 INJECTION, SOLUTION INTRAMUSCULAR; INTRAVENOUS at 02:59

## 2024-11-21 RX ADMIN — ACETAMINOPHEN 975 MG: 325 TABLET, FILM COATED ORAL at 02:59

## 2024-11-21 RX ADMIN — LIDOCAINE 1 PATCH: 50 PATCH TOPICAL at 02:58

## 2024-11-21 NOTE — Clinical Note
Yanira Scott was seen and treated in our emergency department on 11/21/2024.                Diagnosis:     Yanira  may return to work on return date.    She may return on this date: 11/22/2024         If you have any questions or concerns, please don't hesitate to call.      Gisella Amador, DO    ______________________________           _______________          _______________  Hospital Representative                              Date                                Time

## 2024-11-21 NOTE — ED PROVIDER NOTES
Time reflects when diagnosis was documented in both MDM as applicable and the Disposition within this note       Time User Action Codes Description Comment    11/21/2024  3:03 AM Gisella Amador Jose David [M25.552] Acute hip pain, left           ED Disposition       ED Disposition   Discharge    Condition   Stable    Date/Time   u Nov 21, 2024  3:03 AM    Comment   Yanira Scott discharge to home/self care.                   Assessment & Plan       Medical Decision Making  19-year-old female with left hip pain, intermittent, likely musculoskeletal, will obtain x-ray to evaluate for bony injury such as vascular necrosis, fracture, erosion, slipped capital femoral epiphysis.  There is no overlying signs of inflammation, no joint effusion full intact range of motion no overlying warmth, low suspicion for infectious process or significant inflammatory process    Amount and/or Complexity of Data Reviewed  Labs: ordered.  Radiology: ordered and independent interpretation performed.    Risk  OTC drugs.  Prescription drug management.        ED Course as of 11/21/24 0305   u Nov 21, 2024   0304 Resting comfortably no acute findings noted on x-ray, there is no overlying skin changes, no joint effusion to suggest infection or significant inflammatory process,  Likely musculoskeletal pain will provide information for orthopedics as well as referral to physical therapy, NSAIDs, work note otherwise stable for discharge at this time no indication of further inpatient vision or treatment       Medications   lidocaine (LIDODERM) 5 % patch 1 patch (1 patch Topical Medication Applied 11/21/24 0258)   ketorolac (TORADOL) injection 15 mg (15 mg Intramuscular Given 11/21/24 0259)   acetaminophen (TYLENOL) tablet 975 mg (975 mg Oral Given 11/21/24 0259)       ED Risk Strat Scores                                               History of Present Illness       Chief Complaint   Patient presents with    Hip Pain     Pt c/o of left hip p[ain x 2  vicente.        History reviewed. No pertinent past medical history.   History reviewed. No pertinent surgical history.   History reviewed. No pertinent family history.   Social History     Tobacco Use    Smoking status: Never    Smokeless tobacco: Never   Vaping Use    Vaping status: Every Day   Substance Use Topics    Alcohol use: Not Currently    Drug use: Not Currently      E-Cigarette/Vaping    E-Cigarette Use Current Every Day User       E-Cigarette/Vaping Substances      I have reviewed and agree with the history as documented.     19-year-old female with left hip pain x 2 months states that in April she broke her hand and had to quit her job, she was out of work for 2 months and started work at [employer] in the summer, hip pain started shortly after, states that it is intermittent, lasts a few hours and is relieved by heating pad.,  It is throbbing, constant when it is there.  When it is there it is worse with worse with ambulating, no numbness or tingling of the extremity, no swelling in the groin, no trouble breathing or swallowing, no urinary complaints, no skin changes, no vaginal discharge no specific trauma.        Review of Systems   Constitutional:  Negative for chills and fever.   HENT:  Negative for rhinorrhea and sore throat.    Respiratory:  Negative for cough.    Cardiovascular:  Negative for chest pain and palpitations.   Gastrointestinal:  Negative for abdominal pain, nausea and vomiting.   Genitourinary:  Negative for dysuria, frequency and urgency.   Musculoskeletal:         Left hip pain   Neurological:  Negative for weakness, light-headedness and headaches.   All other systems reviewed and are negative.          Objective       ED Triage Vitals [11/21/24 0230]   Temperature Pulse Blood Pressure Respirations SpO2 Patient Position - Orthostatic VS   98.7 °F (37.1 °C) 82 126/61 18 98 % --      Temp Source Heart Rate Source BP Location FiO2 (%) Pain Score    Temporal -- Right arm -- 2       Vitals      Date and Time Temp Pulse SpO2 Resp BP Pain Score FACES Pain Rating User   11/21/24 0259 -- -- -- -- -- 2 -- NB   11/21/24 0230 98.7 °F (37.1 °C) 82 98 % 18 126/61 2 -- EK            Physical Exam  Vitals and nursing note reviewed.   Constitutional:       Appearance: She is well-developed.   HENT:      Head: Normocephalic and atraumatic.   Cardiovascular:      Rate and Rhythm: Normal rate and regular rhythm.      Heart sounds: No murmur heard.     No friction rub. No gallop.   Pulmonary:      Effort: Pulmonary effort is normal.      Breath sounds: No wheezing or rales.   Chest:      Chest wall: No tenderness.   Abdominal:      General: There is no distension.      Palpations: Abdomen is soft. There is no mass.      Tenderness: There is no abdominal tenderness. There is no guarding or rebound.   Musculoskeletal:         General: Normal range of motion.      Right lower leg: No edema.      Left lower leg: No edema.      Comments: Bilateral extremities neurovascular intact full intact range of bilateral hips with no overlying erythema, no joint effusion, no bony tenderness, patient states that applying pressure to the hip does somewhat relieve the pain.    Skin:     General: Skin is warm and dry.   Neurological:      Mental Status: She is alert and oriented to person, place, and time.         Results Reviewed       Procedure Component Value Units Date/Time    POCT pregnancy, urine [[id number]]  (Normal) Collected: 11/21/24 0244    Lab Status: Final result Updated: 11/21/24 0244     EXT Preg Test, Ur Negative     Control Valid            XR hip/pelv 2-3 vws left if performed   ED Interpretation by Gisella Amador DO (11/21 0303)   This study was ordered and independently reviewed by me    No acute findings noted             Procedures    ED Medication and Procedure Management   Prior to Admission Medications   Prescriptions Last Dose Informant Patient Reported? Taking?   acetaminophen (TYLENOL) 500 mg tablet    Yes No   Sig: Take 500 mg by mouth every 6 (six) hours as needed for mild pain   Patient not taking: Reported on 5/28/2024   ibuprofen (MOTRIN) 200 mg tablet   Yes No   Sig: Take by mouth every 6 (six) hours as needed for mild pain   Patient not taking: Reported on 5/28/2024      Facility-Administered Medications: None     Patient's Medications   Discharge Prescriptions    DICLOFENAC SODIUM (VOLTAREN) 1 %    Apply 2 g topically 4 (four) times a day       Start Date: 11/21/2024End Date: --       Order Dose: 2 g       Quantity: 100 g    Refills: 0       ED SEPSIS DOCUMENTATION   Time reflects when diagnosis was documented in both MDM as applicable and the Disposition within this note       Time User Action Codes Description Comment    11/21/2024  3:03 AM Gisella Amador Add [M25.552] Acute hip pain, left                  Gisella Amador DO  11/21/24 8017

## 2024-12-26 ENCOUNTER — OFFICE VISIT (OUTPATIENT)
Dept: OBGYN CLINIC | Facility: CLINIC | Age: 19
End: 2024-12-26
Payer: COMMERCIAL

## 2024-12-26 VITALS — BODY MASS INDEX: 24.77 KG/M2 | WEIGHT: 134.6 LBS | HEIGHT: 62 IN

## 2024-12-26 DIAGNOSIS — S76.012A STRAIN OF FLEXOR MUSCLE OF LEFT HIP, INITIAL ENCOUNTER: Primary | ICD-10-CM

## 2024-12-26 PROCEDURE — 99214 OFFICE O/P EST MOD 30 MIN: CPT | Performed by: STUDENT IN AN ORGANIZED HEALTH CARE EDUCATION/TRAINING PROGRAM

## 2024-12-26 RX ORDER — DESOGESTREL AND ETHINYL ESTRADIOL 0.15-0.03
1 KIT ORAL
COMMUNITY
Start: 2024-12-17

## 2024-12-26 NOTE — PROGRESS NOTES
Ortho Sports Medicine New Patient Hip Visit    Assesment:   19 y.o. female left hip flexor strain    Plan:  The patient's diagnosis and treatment were discussed at length today. We discussed no treatment, non-operative treatment, and operative treatment.    Flora presents today for initial evaluation left hip flexor strain.  I discussed with her this can be treated nonoperatively with a combination of activity modification and physical therapy.  I did provide her with a new referral to outpatient physical therapy.  She can focus on hip and thigh strengthening and range of motion exercises.  I did discuss with her that her hypermobility may contributed some of her pain and discomfort.  She can continue ice and over-the-counter medication as needed for pain relief.  She can continue activity as tolerated using pain as guide.  She is to follow-up in approximately 2 months for reevaluation.    Conservative Treatment:     Ice to hip region for 20 minutes at least 1-2 times daily.  PT for ROM/strengthening to hip, back, legs and core  OTC NSAIDS prn for pain.  Let pain guide gradual return activities.      Imaging:    All imaging from today was reviewed by myself and explained to the patient.       Injection:      No Injection planned at this time.      Surgery:    No surgery is recommended at this point, continue with conservative treatment plan as noted.      Follow up:    Return in about 2 months (around 2/26/2025).        Chief Complaint   Patient presents with    Left Hip - Pain       History of Present Illness:  The patient is a 19 y.o. female referred to me by REGINE Zhao, seen in clinic for evaluation of left hip pain.  She states that she has been having ongoing left hip pain for several months without any specific injury or trauma.  She states that her pain became worse in November and was seen in the emergency department.  She states that her pain is dull and achy deep within her groin and along the  lateral aspect of her hip.  She states that her pain is worse with prolonged activity.  She states that her pain is alleviated with sitting and laying down.  She denies any significant pain when bending down to tie her shoes.  She denies any pain with crossing her legs as this does somewhat alleviate her symptoms and feels like a stretch.  She has not attempted any outpatient physical therapy.  She denies any radiating symptoms of numbness or tingling.  She denies any previous history of injury or trauma to her head.      Hip Surgical History:  None      Past Medical, Social and Family History:  History reviewed. No pertinent past medical history.  History reviewed. No pertinent surgical history.  No Known Allergies  Current Outpatient Medications on File Prior to Visit   Medication Sig Dispense Refill    acetaminophen (TYLENOL) 500 mg tablet Take 500 mg by mouth every 6 (six) hours as needed for mild pain      Apri 0.15-30 MG-MCG per tablet 1 tablet      Diclofenac Sodium (VOLTAREN) 1 % Apply 2 g topically 4 (four) times a day 100 g 0    ibuprofen (MOTRIN) 200 mg tablet Take by mouth every 6 (six) hours as needed for mild pain       No current facility-administered medications on file prior to visit.     Social History     Socioeconomic History    Marital status: Single     Spouse name: Not on file    Number of children: Not on file    Years of education: Not on file    Highest education level: Not on file   Occupational History    Not on file   Tobacco Use    Smoking status: Never    Smokeless tobacco: Never   Vaping Use    Vaping status: Every Day   Substance and Sexual Activity    Alcohol use: Not Currently    Drug use: Not Currently    Sexual activity: Not on file   Other Topics Concern    Not on file   Social History Narrative    Not on file     Social Drivers of Health     Financial Resource Strain: Not on file   Food Insecurity: Not on file   Transportation Needs: Not on file   Physical Activity: Not on file  "  Stress: Not on file   Social Connections: Not on file   Intimate Partner Violence: Not on file   Housing Stability: Not on file         I have reviewed the past medical, surgical, social and family history, medications and allergies as documented in the EMR.    Review of systems: ROS is negative other than that noted in the HPI.  Constitutional: Negative for fatigue and fever.   HENT: Negative for sore throat.    Respiratory: Negative for shortness of breath.    Cardiovascular: Negative for chest pain.   Gastrointestinal: Negative for abdominal pain.   Endocrine: Negative for cold intolerance and heat intolerance.   Genitourinary: Negative for flank pain.   Musculoskeletal: Negative for back pain.   Skin: Negative for rash.   Allergic/Immunologic: Negative for immunocompromised state.   Neurological: Negative for dizziness.   Psychiatric/Behavioral: Negative for agitation.      Physical Exam:    Height 5' 2\" (1.575 m), weight 61.1 kg (134 lb 9.6 oz).    General/Constitutional: NAD, well developed, well nourished  HENT: Normocephalic, atraumatic  CV: Intact distal pulses, regular rate  Resp: No respiratory distress or labored breathing  Abd: No abdominal distention  Lymphatic: No lymphadenopathy palpated  Neuro: Alert and Oriented x 3, no focal deficits noted  Psych: Normal mood, normal affect, normal judgement, normal behavior  Skin: Warm, dry, no rashes, no erythema     Hip Exam (focused):    left hip:     No dislocation/deformity  ROM: Flexion: 120, ER: 85, IR 60  Greater troch:non-tender   SI joint: non-tender  No ASIS or hip flexor tenderness  Zach test: negative  Russ's test:  negative  Abduction: 5/5  AT/GS intact  Osborne's Test: negative  Ac Test: negative    Back:    No TTP over lumbar spinous processes, paraspinal musculature  Negative SLR     No calf tenderness to palpation bilaterally    LE NV Exam: +2 DP/PT pulses bilaterally  Sensation intact to light touch L2-S1 bilaterally      Hip " Imaging:    X-rays of the left hip were reviewed, which demonstrate no acute osseous abnormalities.  I have reviewed the radiology report andagree with their impression.      Scribe Attestation      I,:  David Mari am acting as a scribe while in the presence of the attending physician.:       I,:  Rafael Melendrez, DO personally performed the services described in this documentation    as scribed in my presence.:

## 2025-06-18 NOTE — DISCHARGE INSTRUCTIONS
I have placed referral to orthopedic surgery so you may closely follow-up regarding your hand fracture.  They should call, however I have also provided the number for orthopedic surgery close to the [facility].  Take 600 mg ibuprofen (Motrin) 3 times daily with food and 1000mg acetaminophen (Tylenol) every 8 hours as needed for pain.  Keep the splint dry.    Return to the ER if you have new or worsening pain or coldness or numbness or paleness of the affected finger.   Yes